# Patient Record
(demographics unavailable — no encounter records)

---

## 2025-07-07 NOTE — PLAN
[FreeTextEntry1] : 72 yo F w/ pmh of HTN, HLD, gum erythema, arthritis of B/L knees presenting for awv.  #Knee Pain -Endorses pain/tightness with ambulation -Fall about 1-2 months ago was minor with abrasion to patella -Last saw Dr. Fenton (ortho) 2 years ago and prescribed oral steroids with improvement in pain -Will prescribe short course of steroids  #HTN -C/w current regimen  #frequent UTI's -UA w/ urine culture -f/u URO/GYN -2x this year, most recent treated with Cipro in 05/2025  Elevated ESR - repeat level  B12 def - cont B12 - f/u level   #HCM -Cologuard 1 year ago, due in 2027 -Due for Mammo w/ US, h/o dense breasts with normal biopsies in past  -f/u blood work

## 2025-07-07 NOTE — HISTORY OF PRESENT ILLNESS
[FreeTextEntry1] : eduardo [de-identified] : 70 yo F presenting for awv. Fall one month ago on left knee. States she is going on sweet sixteen with granddaughter to [place], but her knee and hip pain/tightness have increased over the last year. States it was improved with steroids from Dr. Fenton last year and wants to know if she can get them again. States she has been getting UTI's more frequently (usually one a year, now twice this year already). Was last on Cipro in May 2025. States she does not normally have dysuria when she develops a UTI. States she currently has lower abdominal pressure a/w her past UTI's. States she was seen by a dermatologist who prescribed her clobetasol for gum redness which has helped considerably.

## 2025-07-07 NOTE — PHYSICAL EXAM
[Normal Oropharynx] : the oropharynx was normal [Normal TMs] : both tympanic membranes were normal [No Lymphadenopathy] : no lymphadenopathy [No Respiratory Distress] : no respiratory distress  [Clear to Auscultation] : lungs were clear to auscultation bilaterally [Grossly Normal Strength/Tone] : grossly normal strength/tone [Normal] : normal gait, coordination grossly intact, no focal deficits and deep tendon reflexes were 2+ and symmetric [de-identified] : Erythema to superior and inferior gums [de-identified] : +no specific tenderness on palpation to B/L knees

## 2025-07-07 NOTE — HEALTH RISK ASSESSMENT
[Yes] : Yes [2 - 4 times a month (2 pts)] : 2-4 times a month (2 points) [1 or 2 (0 pts)] : 1 or 2 (0 points) [Never (0 pts)] : Never (0 points) [One fall no injury in past year] : Patient reported one fall in the past year without injury [Little interest or pleasure doing things] : 1) Little interest or pleasure doing things [Feeling down, depressed, or hopeless] : 2) Feeling down, depressed, or hopeless [Never] : Never [Patient reported mammogram was abnormal] : Patient reported mammogram was abnormal [Alone] : lives alone [] :  [Feels Safe at Home] : Feels safe at home [Fully functional (bathing, dressing, toileting, transferring, walking, feeding)] : Fully functional (bathing, dressing, toileting, transferring, walking, feeding) [Smoke Detector] : smoke detector [Carbon Monoxide Detector] : carbon monoxide detector [Seat Belt] :  uses seat belt [Sunscreen] : uses sunscreen [Safety elements used in home] : safety elements used in home [Audit-CScore] : 3 [[id number]] : 0 [Reports changes in hearing] : Reports no changes in hearing [Reports changes in vision] : Reports no changes in vision [Reports changes in dental health] : Reports no changes in dental health [MammogramDate] : 4134 [MammogramComments] : biopsies negative, dense breast require US [FreeTextEntry2] : Retired

## 2025-07-07 NOTE — PHYSICAL EXAM
[Normal Oropharynx] : the oropharynx was normal [Normal TMs] : both tympanic membranes were normal [No Lymphadenopathy] : no lymphadenopathy [No Respiratory Distress] : no respiratory distress  [Clear to Auscultation] : lungs were clear to auscultation bilaterally [Grossly Normal Strength/Tone] : grossly normal strength/tone [Normal] : normal gait, coordination grossly intact, no focal deficits and deep tendon reflexes were 2+ and symmetric [de-identified] : Erythema to superior and inferior gums [de-identified] : +no specific tenderness on palpation to B/L knees

## 2025-07-07 NOTE — REVIEW OF SYSTEMS
[Joint Pain] : joint pain [Muscle Pain] : muscle pain [Negative] : Heme/Lymph [Earache] : no earache [Nasal Discharge] : no nasal discharge [Sore Throat] : no sore throat [FreeTextEntry4] : +gum erythema [FreeTextEntry9] : b/l knees

## 2025-07-07 NOTE — HEALTH RISK ASSESSMENT
[Yes] : Yes [2 - 4 times a month (2 pts)] : 2-4 times a month (2 points) [1 or 2 (0 pts)] : 1 or 2 (0 points) [Never (0 pts)] : Never (0 points) [One fall no injury in past year] : Patient reported one fall in the past year without injury [Little interest or pleasure doing things] : 1) Little interest or pleasure doing things [Feeling down, depressed, or hopeless] : 2) Feeling down, depressed, or hopeless [Never] : Never [Patient reported mammogram was abnormal] : Patient reported mammogram was abnormal [Alone] : lives alone [] :  [Feels Safe at Home] : Feels safe at home [Fully functional (bathing, dressing, toileting, transferring, walking, feeding)] : Fully functional (bathing, dressing, toileting, transferring, walking, feeding) [Smoke Detector] : smoke detector [Carbon Monoxide Detector] : carbon monoxide detector [Seat Belt] :  uses seat belt [Sunscreen] : uses sunscreen [Safety elements used in home] : safety elements used in home [Audit-CScore] : 3 [[id number]] : 0 [Reports changes in hearing] : Reports no changes in hearing [Reports changes in vision] : Reports no changes in vision [Reports changes in dental health] : Reports no changes in dental health [MammogramDate] : 3297 [MammogramComments] : biopsies negative, dense breast require US [FreeTextEntry2] : Retired